# Patient Record
Sex: MALE | Race: WHITE | NOT HISPANIC OR LATINO | URBAN - METROPOLITAN AREA
[De-identification: names, ages, dates, MRNs, and addresses within clinical notes are randomized per-mention and may not be internally consistent; named-entity substitution may affect disease eponyms.]

---

## 2019-10-10 ENCOUNTER — OFFICE (OUTPATIENT)
Dept: URBAN - METROPOLITAN AREA CLINIC 75 | Facility: CLINIC | Age: 19
End: 2019-10-10

## 2019-10-10 VITALS
HEIGHT: 74 IN | DIASTOLIC BLOOD PRESSURE: 88 MMHG | HEART RATE: 40 BPM | WEIGHT: 157 LBS | SYSTOLIC BLOOD PRESSURE: 122 MMHG

## 2019-10-10 DIAGNOSIS — R19.7 DIARRHEA, UNSPECIFIED: ICD-10-CM

## 2019-10-10 DIAGNOSIS — R10.10 UPPER ABDOMINAL PAIN, UNSPECIFIED: ICD-10-CM

## 2019-10-10 PROCEDURE — 99203 OFFICE O/P NEW LOW 30 MIN: CPT | Performed by: INTERNAL MEDICINE

## 2019-10-10 RX ORDER — HYOSCYAMINE SULFATE 0.12 MG/1
0.5 TABLET, ORALLY DISINTEGRATING ORAL
Qty: 45 | Refills: 5 | Status: ACTIVE
Start: 2019-10-10

## 2019-10-18 ENCOUNTER — OFFICE (OUTPATIENT)
Dept: URBAN - METROPOLITAN AREA LAB 2 | Facility: LAB | Age: 19
End: 2019-10-18

## 2019-10-18 DIAGNOSIS — R19.7 DIARRHEA, UNSPECIFIED: ICD-10-CM

## 2019-10-18 DIAGNOSIS — K58.0 IRRITABLE BOWEL SYNDROME WITH DIARRHEA: ICD-10-CM

## 2019-10-18 DIAGNOSIS — R10.10 UPPER ABDOMINAL PAIN, UNSPECIFIED: ICD-10-CM

## 2019-10-18 PROCEDURE — 83993 ASSAY FOR CALPROTECTIN FECAL: CPT | Performed by: INTERNAL MEDICINE

## 2019-10-18 PROCEDURE — 87507 IADNA-DNA/RNA PROBE TQ 12-25: CPT | Performed by: INTERNAL MEDICINE

## 2019-12-26 ENCOUNTER — OFFICE (OUTPATIENT)
Dept: URBAN - METROPOLITAN AREA CLINIC 75 | Facility: CLINIC | Age: 19
End: 2019-12-26

## 2019-12-26 VITALS
SYSTOLIC BLOOD PRESSURE: 158 MMHG | HEIGHT: 74 IN | DIASTOLIC BLOOD PRESSURE: 98 MMHG | WEIGHT: 161 LBS | HEART RATE: 51 BPM

## 2019-12-26 DIAGNOSIS — R10.10 UPPER ABDOMINAL PAIN, UNSPECIFIED: ICD-10-CM

## 2019-12-26 DIAGNOSIS — R19.7 DIARRHEA, UNSPECIFIED: ICD-10-CM

## 2019-12-26 PROCEDURE — 99213 OFFICE O/P EST LOW 20 MIN: CPT | Performed by: INTERNAL MEDICINE

## 2020-09-22 ENCOUNTER — OFFICE VISIT (OUTPATIENT)
Dept: GASTROENTEROLOGY | Facility: CLINIC | Age: 20
End: 2020-09-22

## 2020-09-22 VITALS
TEMPERATURE: 97.7 F | HEART RATE: 44 BPM | BODY MASS INDEX: 20.17 KG/M2 | HEIGHT: 74 IN | WEIGHT: 157.2 LBS | DIASTOLIC BLOOD PRESSURE: 60 MMHG | OXYGEN SATURATION: 98 % | RESPIRATION RATE: 16 BRPM | SYSTOLIC BLOOD PRESSURE: 112 MMHG

## 2020-09-22 DIAGNOSIS — R10.9 ABDOMINAL PAIN, UNSPECIFIED ABDOMINAL LOCATION: Primary | ICD-10-CM

## 2020-09-22 PROCEDURE — 99244 OFF/OP CNSLTJ NEW/EST MOD 40: CPT | Performed by: INTERNAL MEDICINE

## 2020-09-22 RX ORDER — HYOSCYAMINE SULFATE 0.12 MG/ML
LIQUID ORAL EVERY 4 HOURS PRN
COMMUNITY

## 2020-09-22 NOTE — PROGRESS NOTES
Diarrhea and Bloated      HPI    Delightful 20-year-old gentleman here regarding a second opinion.  He was seeing Dr. Man here in [place].  He states that 2 years ago he started taking beta alanine as a supplement and feels his got has been abnormal since that time.  He thinks he had a bad reaction to it he says that when he is running and only when he is running he has a feeling of things sloshing around in his stomach it is associated with gas burping belching and flatus.  He feels bloated not constipated.  He has had an extensive work-up below.  He has had some mild benefit to the use of Levsin but not complete.  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  He has no blood in his stool his weight is stable he has no vomiting    He has tried cutting out all dairy.  He has tried a FODMAP diet and even met with a nutritionist at [facility].    He has never had EGD and colonoscopy    Previous work-up has included an upper GI and small bowel follow-through on 7/17/2020 that showed mild reflux and otherwise unremarkable upper GI tract series there with a small intestinal bacterial overgrowth test with a unsupported finding of bacterial overgrowth although there was some increase in gas values during the exam subclinical hypothyroidism was found as well and he was seen by Dr. Holbrook hemoglobin level was normal normal fecal calprotectin and stool studies negative TTG    Review of Systems   Constitutional: Negative for appetite change, chills, diaphoresis, fatigue, fever and unexpected weight change.   HENT: Negative for dental problem, ear pain, mouth sores, rhinorrhea, sore throat and voice change.    Eyes: Negative for pain, redness and visual disturbance.    Respiratory: Negative for cough, chest tightness and wheezing.    Cardiovascular: Negative for chest pain, palpitations and leg swelling.   Endocrine: Negative for cold intolerance, heat intolerance, polydipsia, polyphagia and polyuria.   Genitourinary: Negative for dysuria, frequency, hematuria and urgency.   Musculoskeletal: Negative for arthralgias, back pain, joint swelling, myalgias and neck pain.   Skin: Negative for rash.   Allergic/Immunologic: Negative for environmental allergies, food allergies and immunocompromised state.   Neurological: Negative for dizziness, seizures, weakness, numbness and headaches.   Hematological: Does not bruise/bleed easily.   Psychiatric/Behavioral: Negative for sleep disturbance. The patient is not nervous/anxious.         I have reviewed and confirmed the accuracy of the HPI and ROS as documented by the APRN Alfonso Finney MD     Problem List:  There is no problem list on file for this patient.      Medical History:  History reviewed. No pertinent past medical history.     Social History:    Social History     Socioeconomic History   • Marital status: Single     Spouse name: Not on file   • Number of children: Not on file   • Years of education: Not on file   • Highest education level: Not on file   Tobacco Use   • Smoking status: Never Smoker   • Smokeless tobacco: Never Used   Substance and Sexual Activity   • Alcohol use: Yes     Comment: social   • Drug use: Never       Family History:   Family History   Problem Relation Age of Onset   • Colon cancer Neg Hx    • Colon polyps Neg Hx        Surgical History: History reviewed. No pertinent surgical history.      Current Outpatient Medications:   •  hyoscyamine (LEVSIN) 0.125 MG/ML solution, Take  by mouth Every 4 (Four) Hours As Needed for Bladder Spasms., Disp: , Rfl:   •  riFAXIMin (Xifaxan) 550 MG tablet, Take 1 tablet by mouth 3 (Three) Times a Day for 14 days., Disp: 42 tablet, Rfl: 2    Allergies: No Known  Allergies     The following portions of the patient's history were reviewed and updated as appropriate: allergies, current medications, past family history, past medical history, past social history, past surgical history and problem list.    Vitals:    09/22/20 1358   BP: 112/60   Pulse: (!) 44   Resp: 16   Temp: 97.7 °F (36.5 °C)   SpO2: 98%         09/22/20  1358   Weight: 71.3 kg (157 lb 3.2 oz)     Body mass index is 20.18 kg/m².      PHYSICAL EXAM:  Physical Exam  Vitals signs reviewed.   Constitutional:       Appearance: Normal appearance.   HENT:      Nose: No nasal deformity.   Eyes:      General: No scleral icterus.  Neck:      Comments: Trachea midline.  Cardiovascular:      Rate and Rhythm: Normal rate and regular rhythm.   Pulmonary:      Effort: No respiratory distress.      Breath sounds: Normal breath sounds.   Abdominal:      General: Bowel sounds are normal. There is no distension.      Palpations: Abdomen is soft. There is no mass.      Tenderness: There is no abdominal tenderness.   Lymphadenopathy:      Comments: No periumbilical lymphadenopathy.   Skin:     General: Skin is warm.   Neurological:      Mental Status: He is alert.           Assessment/ Plan  Jaquan was seen today for diarrhea and bloated.    Diagnoses and all orders for this visit:    Abdominal pain, unspecified abdominal location  -     CT Abdomen Pelvis With Contrast; Future    Other orders  -     riFAXIMin (Xifaxan) 550 MG tablet; Take 1 tablet by mouth 3 (Three) Times a Day for 14 days.         No follow-ups on file.    There are no Patient Instructions on file for this visit.      Discussion:  Discussed at length with the patient that he has had a very good work-up with his original GI doctor.  However he is seeking a second opinion and I wanted to go over with him where I thought diagnostic tests might be useful.  He has not had an EGD and colonoscopy and he is still interested in trying to do this noninvasively which I  certainly respect but as he is not improved and has ongoing symptoms an EGD and colonoscopy might be warranted.  However, we both agreed that we would do a CT scan of the abdomen and pelvis to make sure we did not see any overt abnormalities to explain his symptoms and we would do a trial of Xifaxan as these symptoms seem to have started after starting a supplement and perhaps the gas bloating flatulence and diarrhea might improve with a short course of treatment.  If the CT scan is normal and the medicine works we can continue to monitor things if however he is unimproved from this approach he is agreeable to consider EGD and colonoscopy when it is time well with his running season    He will continue on the PPI the Citrucel and the Levsin all of which which have been helpful to some degree

## 2020-10-05 ENCOUNTER — HOSPITAL ENCOUNTER (OUTPATIENT)
Dept: CT IMAGING | Facility: HOSPITAL | Age: 20
Discharge: HOME | End: 2020-10-05
Admitting: INTERNAL MEDICINE

## 2020-10-05 DIAGNOSIS — R10.9 ABDOMINAL PAIN, UNSPECIFIED ABDOMINAL LOCATION: ICD-10-CM

## 2020-10-05 PROCEDURE — 74177 CT ABD & PELVIS W/CONTRAST: CPT

## 2020-10-05 PROCEDURE — 25010000002 IOPAMIDOL 61 % SOLUTION: Performed by: INTERNAL MEDICINE

## 2020-10-05 RX ADMIN — IOPAMIDOL 85 ML: 612 INJECTION, SOLUTION INTRAVENOUS at 15:46

## 2021-03-23 ENCOUNTER — LAB REQUISITION (OUTPATIENT)
Dept: LAB | Facility: HOSPITAL | Age: 21
End: 2021-03-23

## 2021-03-23 DIAGNOSIS — Z00.00 ENCOUNTER FOR GENERAL ADULT MEDICAL EXAMINATION WITHOUT ABNORMAL FINDINGS: ICD-10-CM

## 2021-03-23 LAB — SARS-COV-2 ORF1AB RESP QL NAA+PROBE: NOT DETECTED

## 2021-03-23 PROCEDURE — U0004 COV-19 TEST NON-CDC HGH THRU: HCPCS | Performed by: OTOLARYNGOLOGY

## 2021-03-26 ENCOUNTER — LAB REQUISITION (OUTPATIENT)
Dept: LAB | Facility: HOSPITAL | Age: 21
End: 2021-03-26

## 2021-03-26 DIAGNOSIS — Z00.00 ENCOUNTER FOR GENERAL ADULT MEDICAL EXAMINATION WITHOUT ABNORMAL FINDINGS: ICD-10-CM

## 2021-03-26 PROCEDURE — 88304 TISSUE EXAM BY PATHOLOGIST: CPT | Performed by: OTOLARYNGOLOGY

## 2021-03-29 LAB
CYTO UR: NORMAL
LAB AP CASE REPORT: NORMAL
LAB AP CLINICAL INFORMATION: NORMAL
PATH REPORT.FINAL DX SPEC: NORMAL
PATH REPORT.GROSS SPEC: NORMAL